# Patient Record
Sex: MALE | Race: WHITE | NOT HISPANIC OR LATINO | Employment: OTHER | URBAN - METROPOLITAN AREA
[De-identification: names, ages, dates, MRNs, and addresses within clinical notes are randomized per-mention and may not be internally consistent; named-entity substitution may affect disease eponyms.]

---

## 2017-01-26 ENCOUNTER — TRANSCRIBE ORDERS (OUTPATIENT)
Dept: ADMINISTRATIVE | Facility: HOSPITAL | Age: 57
End: 2017-01-26

## 2017-01-26 DIAGNOSIS — E03.9 UNSPECIFIED HYPOTHYROIDISM: Primary | ICD-10-CM

## 2017-01-30 ENCOUNTER — HOSPITAL ENCOUNTER (OUTPATIENT)
Dept: RADIOLOGY | Facility: HOSPITAL | Age: 57
Discharge: HOME | End: 2017-01-30
Payer: COMMERCIAL

## 2017-01-30 DIAGNOSIS — E03.9 UNSPECIFIED HYPOTHYROIDISM: ICD-10-CM

## 2017-01-30 PROCEDURE — 76536 US EXAM OF HEAD AND NECK: CPT

## 2019-11-18 ENCOUNTER — TELEPHONE (OUTPATIENT)
Dept: GASTROENTEROLOGY | Facility: CLINIC | Age: 59
End: 2019-11-18

## 2020-02-12 ENCOUNTER — OFFICE VISIT (OUTPATIENT)
Dept: GASTROENTEROLOGY | Facility: CLINIC | Age: 60
End: 2020-02-12
Payer: COMMERCIAL

## 2020-02-12 VITALS
HEART RATE: 70 BPM | HEIGHT: 67 IN | WEIGHT: 166.38 LBS | DIASTOLIC BLOOD PRESSURE: 70 MMHG | TEMPERATURE: 98.7 F | SYSTOLIC BLOOD PRESSURE: 112 MMHG | BODY MASS INDEX: 26.11 KG/M2

## 2020-02-12 DIAGNOSIS — Z86.010 HISTORY OF COLON POLYPS: Primary | ICD-10-CM

## 2020-02-12 PROBLEM — Z86.0100 HISTORY OF COLON POLYPS: Status: ACTIVE | Noted: 2020-02-12

## 2020-02-12 PROCEDURE — 99243 OFF/OP CNSLTJ NEW/EST LOW 30: CPT | Performed by: INTERNAL MEDICINE

## 2020-02-12 RX ORDER — LEVOTHYROXINE SODIUM 0.12 MG/1
125 TABLET ORAL DAILY
COMMUNITY
Start: 2020-01-06 | End: 2020-04-05

## 2020-02-12 RX ORDER — PRAVASTATIN SODIUM 40 MG
TABLET ORAL
COMMUNITY
Start: 2020-01-23

## 2020-02-12 NOTE — PROGRESS NOTES
Consultation - 126 [facility]  [address] [facility] 1960 male         Chief Complaint:  For colonoscopy    HPI:  75-year-old male with history of hyperlipidemia, hypothyroidism was referred for colonoscopy  He gives history of colon polyps on the last colonoscopy was about 10 years ago  Patient has regular bowel movements and denies any blood or mucus in the stool  Appetite is good and denies any recent weight loss  Denies any abdominal pain, nausea, or vomiting  Has no heartburn or acid reflux  Denies any difficulty swallowing  REVIEW OF SYSTEMS: Review of Systems   Constitutional: Negative for activity change, appetite change, chills, diaphoresis, fatigue, fever and unexpected weight change  HENT: Negative for ear discharge, ear pain, facial swelling, hearing loss, nosebleeds, sore throat, tinnitus and voice change  Eyes: Negative for pain, discharge, redness, itching and visual disturbance  Respiratory: Negative for apnea, cough, chest tightness, shortness of breath and wheezing  Cardiovascular: Negative for chest pain and palpitations  Gastrointestinal:        As noted in HPI   Endocrine: Negative for cold intolerance, heat intolerance and polyuria  Genitourinary: Negative for difficulty urinating, dysuria, flank pain, hematuria and urgency  Musculoskeletal: Negative for arthralgias, back pain, gait problem, joint swelling and myalgias  Skin: Negative for rash and wound  Neurological: Negative for dizziness, tremors, seizures, speech difficulty, light-headedness, numbness and headaches  Hematological: Negative for adenopathy  Does not bruise/bleed easily  Psychiatric/Behavioral: Negative for agitation, behavioral problems and confusion  The patient is not nervous/anxious           Past Medical History:   Diagnosis Date    Hyperlipidemia     Hypothyroidism       Past Surgical History:   Procedure Laterality Date    CYST REMOVAL      lower back     HEMORROIDECTOMY      two surgerys in late 30-40's     Social History     Socioeconomic History    Marital status: /Civil Union     Spouse name: Not on file    Number of children: Not on file    Years of education: Not on file    Highest education level: Not on file   Occupational History    Not on file   Social Needs    Financial resource strain: Not on file    Food insecurity:     Worry: Not on file     Inability: Not on file    Transportation needs:     Medical: Not on file     Non-medical: Not on file   Tobacco Use    Smoking status: Former Smoker    Smokeless tobacco: Never Used   Substance and Sexual Activity    Alcohol use: Yes     Comment: occ    Drug use: Never    Sexual activity: Not on file   Lifestyle    Physical activity:     Days per week: Not on file     Minutes per session: Not on file    Stress: Not on file   Relationships    Social connections:     Talks on phone: Not on file     Gets together: Not on file     Attends Scientologist service: Not on file     Active member of club or organization: Not on file     Attends meetings of clubs or organizations: Not on file     Relationship status: Not on file    Intimate partner violence:     Fear of current or ex partner: Not on file     Emotionally abused: Not on file     Physically abused: Not on file     Forced sexual activity: Not on file   Other Topics Concern    Not on file   Social History Narrative    Not on file     Family History   Problem Relation Age of Onset    Cancer Mother     Ovarian cancer Mother     Heart attack Father     Thyroid cancer Maternal Grandmother      Patient has no known allergies    Current Outpatient Medications   Medication Sig Dispense Refill    levothyroxine 125 mcg tablet Take 125 mcg by mouth daily      pravastatin (PRAVACHOL) 40 mg tablet TAKE 1 TABLET BY MOUTH  NIGHTLY      Na Sulfate-K Sulfate-Mg Sulf (SUPREP BOWEL PREP KIT) 17 5-3 13-1 6 GM/177ML SOLN Take 2 Bottles by mouth see administration instructions Please follow the instructions from the office 2 Bottle 0     No current facility-administered medications for this visit  Blood pressure 112/70, pulse 70, temperature 98 7 °F (37 1 °C), height 5' 7" (1 702 m), weight 75 5 kg (166 lb 6 oz)  PHYSICAL EXAM: Physical Exam   Constitutional: He is oriented to person, place, and time  He appears well-developed  HENT:   Head: Normocephalic and atraumatic  Mouth/Throat: Oropharynx is clear and moist    Eyes: Pupils are equal, round, and reactive to light  Conjunctivae are normal  Right eye exhibits no discharge  Left eye exhibits no discharge  No scleral icterus  Neck: Neck supple  No JVD present  No tracheal deviation present  No thyromegaly present  Cardiovascular: Normal rate, regular rhythm, normal heart sounds and intact distal pulses  Exam reveals no gallop and no friction rub  No murmur heard  Pulmonary/Chest: Effort normal and breath sounds normal  No respiratory distress  He has no wheezes  He has no rales  He exhibits no tenderness  Abdominal: Soft  Bowel sounds are normal  He exhibits no distension and no mass  There is no tenderness  There is no rebound and no guarding  No hernia  Musculoskeletal: He exhibits no edema  Lymphadenopathy:     He has no cervical adenopathy  Neurological: He is alert and oriented to person, place, and time  Skin: Skin is warm and dry  No rash noted  No erythema  Psychiatric: He has a normal mood and affect  His behavior is normal  Thought content normal         No results found for: WBC, HGB, HCT, MCV, PLT  No results found for: GLUCOSE, CALCIUM, NA, K, CO2, CL, BUN, CREATININE  No results found for: ALT, AST, GGT, ALKPHOS, BILITOT  No results found for: INR, PROTIME    Us Thyroid    Result Date: 1/31/2017  Impression:  Small heterogeneous thyroid gland without interval change  No nodules      Workstation performed: [id number]       ASSESSMENT & PLAN:    History of colon polyps  Personal history of colon polyps- patient is at increased risk for colon cancer screening  Rule out colorectal lesions including polyps or malignancy     -Schedule for colonoscopy  -High-fiber diet     -Patient was given instructions about the colonoscopy prep     -Patient was explained about  the risks and benefits of the procedure  Risks including but not limited to bleeding, infection, perforation were explained in detail  Also explained about less than 100% sensitivity with the exam and other alternatives

## 2020-02-12 NOTE — LETTER
February 13, 2020     Referral [facility]    Patient: Abi Blank   YOB: 1960   Date of Visit: 2/12/2020       Dear Dr Pérez Johns:    Thank you for referring Alta Drake to me for evaluation  Below are my notes for this consultation  If you have questions, please do not hesitate to call me  I look forward to following your patient along with you  Sincerely,        Shannan Sofia MD        CC: DO Shannan Lange MD  2/12/2020  3:56 PM  Addendum  Consultation - 126 [facility]  Abi Blank 1960 male         Chief Complaint:  For colonoscopy    HPI:  57-year-old male with history of hyperlipidemia, hypothyroidism was referred for colonoscopy  He gives history of colon polyps on the last colonoscopy was about 10 years ago  Patient has regular bowel movements and denies any blood or mucus in the stool  Appetite is good and denies any recent weight loss  Denies any abdominal pain, nausea, or vomiting  Has no heartburn or acid reflux  Denies any difficulty swallowing  REVIEW OF SYSTEMS: Review of Systems   Constitutional: Negative for activity change, appetite change, chills, diaphoresis, fatigue, fever and unexpected weight change  HENT: Negative for ear discharge, ear pain, facial swelling, hearing loss, nosebleeds, sore throat, tinnitus and voice change  Eyes: Negative for pain, discharge, redness, itching and visual disturbance  Respiratory: Negative for apnea, cough, chest tightness, shortness of breath and wheezing  Cardiovascular: Negative for chest pain and palpitations  Gastrointestinal:        As noted in HPI   Endocrine: Negative for cold intolerance, heat intolerance and polyuria  Genitourinary: Negative for difficulty urinating, dysuria, flank pain, hematuria and urgency  Musculoskeletal: Negative for arthralgias, back pain, gait problem, joint swelling and myalgias     Skin: Negative for rash and wound  Neurological: Negative for dizziness, tremors, seizures, speech difficulty, light-headedness, numbness and headaches  Hematological: Negative for adenopathy  Does not bruise/bleed easily  Psychiatric/Behavioral: Negative for agitation, behavioral problems and confusion  The patient is not nervous/anxious           Past Medical History:   Diagnosis Date    Hyperlipidemia     Hypothyroidism       Past Surgical History:   Procedure Laterality Date    CYST REMOVAL      lower back     HEMORROIDECTOMY      two surgerys in late 30-40's     Social History     Socioeconomic History    Marital status: /Civil Union     Spouse name: Not on file    Number of children: Not on file    Years of education: Not on file    Highest education level: Not on file   Occupational History    Not on file   Social Needs    Financial resource strain: Not on file    Food insecurity:     Worry: Not on file     Inability: Not on file    Transportation needs:     Medical: Not on file     Non-medical: Not on file   Tobacco Use    Smoking status: Former Smoker    Smokeless tobacco: Never Used   Substance and Sexual Activity    Alcohol use: Yes     Comment: occ    Drug use: Never    Sexual activity: Not on file   Lifestyle    Physical activity:     Days per week: Not on file     Minutes per session: Not on file    Stress: Not on file   Relationships    Social connections:     Talks on phone: Not on file     Gets together: Not on file     Attends Alevism service: Not on file     Active member of club or organization: Not on file     Attends meetings of clubs or organizations: Not on file     Relationship status: Not on file    Intimate partner violence:     Fear of current or ex partner: Not on file     Emotionally abused: Not on file     Physically abused: Not on file     Forced sexual activity: Not on file   Other Topics Concern    Not on file   Social History Narrative    Not on file     Family History Problem Relation Age of Onset    Cancer Mother     Ovarian cancer Mother     Heart attack Father     Thyroid cancer Maternal Grandmother      Patient has no known allergies  Current Outpatient Medications   Medication Sig Dispense Refill    levothyroxine 125 mcg tablet Take 125 mcg by mouth daily      pravastatin (PRAVACHOL) 40 mg tablet TAKE 1 TABLET BY MOUTH  NIGHTLY      Na Sulfate-K Sulfate-Mg Sulf (SUPREP BOWEL PREP KIT) 17 5-3 13-1 6 GM/177ML SOLN Take 2 Bottles by mouth see administration instructions Please follow the instructions from the office 2 Bottle 0     No current facility-administered medications for this visit  Blood pressure 112/70, pulse 70, temperature 98 7 °F (37 1 °C), height 5' 7" (1 702 m), weight 75 5 kg (166 lb 6 oz)  PHYSICAL EXAM: Physical Exam   Constitutional: He is oriented to person, place, and time  He appears well-developed  HENT:   Head: Normocephalic and atraumatic  Mouth/Throat: Oropharynx is clear and moist    Eyes: Pupils are equal, round, and reactive to light  Conjunctivae are normal  Right eye exhibits no discharge  Left eye exhibits no discharge  No scleral icterus  Neck: Neck supple  No JVD present  No tracheal deviation present  No thyromegaly present  Cardiovascular: Normal rate, regular rhythm, normal heart sounds and intact distal pulses  Exam reveals no gallop and no friction rub  No murmur heard  Pulmonary/Chest: Effort normal and breath sounds normal  No respiratory distress  He has no wheezes  He has no rales  He exhibits no tenderness  Abdominal: Soft  Bowel sounds are normal  He exhibits no distension and no mass  There is no tenderness  There is no rebound and no guarding  No hernia  Musculoskeletal: He exhibits no edema  Lymphadenopathy:     He has no cervical adenopathy  Neurological: He is alert and oriented to person, place, and time  Skin: Skin is warm and dry  No rash noted  No erythema     Psychiatric: He has a normal mood and affect  His behavior is normal  Thought content normal         No results found for: WBC, HGB, HCT, MCV, PLT  No results found for: GLUCOSE, CALCIUM, NA, K, CO2, CL, BUN, CREATININE  No results found for: ALT, AST, GGT, ALKPHOS, BILITOT  No results found for: INR, PROTIME    Us Thyroid    Result Date: 1/31/2017  Impression:  Small heterogeneous thyroid gland without interval change  No nodules  Workstation performed: [id number]       ASSESSMENT & PLAN:    History of colon polyps  Personal history of colon polyps- patient is at increased risk for colon cancer screening  Rule out colorectal lesions including polyps or malignancy     -Schedule for colonoscopy  -High-fiber diet     -Patient was given instructions about the colonoscopy prep     -Patient was explained about  the risks and benefits of the procedure  Risks including but not limited to bleeding, infection, perforation were explained in detail  Also explained about less than 100% sensitivity with the exam and other alternatives

## 2020-02-12 NOTE — H&P (VIEW-ONLY)
Consultation - 126 [facility]  [address] [facility] 1960 male         Chief Complaint:  For colonoscopy    HPI:  39-year-old male with history of hyperlipidemia, hypothyroidism was referred for colonoscopy  He gives history of colon polyps on the last colonoscopy was about 10 years ago  Patient has regular bowel movements and denies any blood or mucus in the stool  Appetite is good and denies any recent weight loss  Denies any abdominal pain, nausea, or vomiting  Has no heartburn or acid reflux  Denies any difficulty swallowing  REVIEW OF SYSTEMS: Review of Systems   Constitutional: Negative for activity change, appetite change, chills, diaphoresis, fatigue, fever and unexpected weight change  HENT: Negative for ear discharge, ear pain, facial swelling, hearing loss, nosebleeds, sore throat, tinnitus and voice change  Eyes: Negative for pain, discharge, redness, itching and visual disturbance  Respiratory: Negative for apnea, cough, chest tightness, shortness of breath and wheezing  Cardiovascular: Negative for chest pain and palpitations  Gastrointestinal:        As noted in HPI   Endocrine: Negative for cold intolerance, heat intolerance and polyuria  Genitourinary: Negative for difficulty urinating, dysuria, flank pain, hematuria and urgency  Musculoskeletal: Negative for arthralgias, back pain, gait problem, joint swelling and myalgias  Skin: Negative for rash and wound  Neurological: Negative for dizziness, tremors, seizures, speech difficulty, light-headedness, numbness and headaches  Hematological: Negative for adenopathy  Does not bruise/bleed easily  Psychiatric/Behavioral: Negative for agitation, behavioral problems and confusion  The patient is not nervous/anxious           Past Medical History:   Diagnosis Date    Hyperlipidemia     Hypothyroidism       Past Surgical History:   Procedure Laterality Date    CYST REMOVAL      lower back     HEMORROIDECTOMY      two surgerys in late 30-40's     Social History     Socioeconomic History    Marital status: /Civil Union     Spouse name: Not on file    Number of children: Not on file    Years of education: Not on file    Highest education level: Not on file   Occupational History    Not on file   Social Needs    Financial resource strain: Not on file    Food insecurity:     Worry: Not on file     Inability: Not on file    Transportation needs:     Medical: Not on file     Non-medical: Not on file   Tobacco Use    Smoking status: Former Smoker    Smokeless tobacco: Never Used   Substance and Sexual Activity    Alcohol use: Yes     Comment: occ    Drug use: Never    Sexual activity: Not on file   Lifestyle    Physical activity:     Days per week: Not on file     Minutes per session: Not on file    Stress: Not on file   Relationships    Social connections:     Talks on phone: Not on file     Gets together: Not on file     Attends Roman Catholic service: Not on file     Active member of club or organization: Not on file     Attends meetings of clubs or organizations: Not on file     Relationship status: Not on file    Intimate partner violence:     Fear of current or ex partner: Not on file     Emotionally abused: Not on file     Physically abused: Not on file     Forced sexual activity: Not on file   Other Topics Concern    Not on file   Social History Narrative    Not on file     Family History   Problem Relation Age of Onset    Cancer Mother     Ovarian cancer Mother     Heart attack Father     Thyroid cancer Maternal Grandmother      Patient has no known allergies    Current Outpatient Medications   Medication Sig Dispense Refill    levothyroxine 125 mcg tablet Take 125 mcg by mouth daily      pravastatin (PRAVACHOL) 40 mg tablet TAKE 1 TABLET BY MOUTH  NIGHTLY      Na Sulfate-K Sulfate-Mg Sulf (SUPREP BOWEL PREP KIT) 17 5-3 13-1 6 GM/177ML SOLN Take 2 Bottles by mouth see administration instructions Please follow the instructions from the office 2 Bottle 0     No current facility-administered medications for this visit  Blood pressure 112/70, pulse 70, temperature 98 7 °F (37 1 °C), height 5' 7" (1 702 m), weight 75 5 kg (166 lb 6 oz)  PHYSICAL EXAM: Physical Exam   Constitutional: He is oriented to person, place, and time  He appears well-developed  HENT:   Head: Normocephalic and atraumatic  Mouth/Throat: Oropharynx is clear and moist    Eyes: Pupils are equal, round, and reactive to light  Conjunctivae are normal  Right eye exhibits no discharge  Left eye exhibits no discharge  No scleral icterus  Neck: Neck supple  No JVD present  No tracheal deviation present  No thyromegaly present  Cardiovascular: Normal rate, regular rhythm, normal heart sounds and intact distal pulses  Exam reveals no gallop and no friction rub  No murmur heard  Pulmonary/Chest: Effort normal and breath sounds normal  No respiratory distress  He has no wheezes  He has no rales  He exhibits no tenderness  Abdominal: Soft  Bowel sounds are normal  He exhibits no distension and no mass  There is no tenderness  There is no rebound and no guarding  No hernia  Musculoskeletal: He exhibits no edema  Lymphadenopathy:     He has no cervical adenopathy  Neurological: He is alert and oriented to person, place, and time  Skin: Skin is warm and dry  No rash noted  No erythema  Psychiatric: He has a normal mood and affect  His behavior is normal  Thought content normal         No results found for: WBC, HGB, HCT, MCV, PLT  No results found for: GLUCOSE, CALCIUM, NA, K, CO2, CL, BUN, CREATININE  No results found for: ALT, AST, GGT, ALKPHOS, BILITOT  No results found for: INR, PROTIME    Us Thyroid    Result Date: 1/31/2017  Impression:  Small heterogeneous thyroid gland without interval change  No nodules      Workstation performed: [id number]       ASSESSMENT & PLAN:    History of colon polyps  Personal history of colon polyps- patient is at increased risk for colon cancer screening  Rule out colorectal lesions including polyps or malignancy     -Schedule for colonoscopy  -High-fiber diet     -Patient was given instructions about the colonoscopy prep     -Patient was explained about  the risks and benefits of the procedure  Risks including but not limited to bleeding, infection, perforation were explained in detail  Also explained about less than 100% sensitivity with the exam and other alternatives

## 2020-02-20 NOTE — PRE-PROCEDURE INSTRUCTIONS
Pre-Surgery Instructions:   Medication Instructions    levothyroxine 125 mcg tablet Patient was instructed by Physician and understands   pravastatin (PRAVACHOL) 40 mg tablet Patient was instructed by Physician and understands

## 2020-02-23 ENCOUNTER — ANESTHESIA EVENT (OUTPATIENT)
Dept: GASTROENTEROLOGY | Facility: AMBULARY SURGERY CENTER | Age: 60
End: 2020-02-23

## 2020-02-24 ENCOUNTER — HOSPITAL ENCOUNTER (OUTPATIENT)
Dept: GASTROENTEROLOGY | Facility: AMBULARY SURGERY CENTER | Age: 60
Setting detail: OUTPATIENT SURGERY
Discharge: HOME | End: 2020-02-24
Attending: INTERNAL MEDICINE | Admitting: INTERNAL MEDICINE
Payer: COMMERCIAL

## 2020-02-24 ENCOUNTER — ANESTHESIA (OUTPATIENT)
Dept: GASTROENTEROLOGY | Facility: AMBULARY SURGERY CENTER | Age: 60
End: 2020-02-24

## 2020-02-24 VITALS
RESPIRATION RATE: 16 BRPM | DIASTOLIC BLOOD PRESSURE: 73 MMHG | OXYGEN SATURATION: 99 % | SYSTOLIC BLOOD PRESSURE: 143 MMHG | TEMPERATURE: 97.3 F | HEART RATE: 60 BPM

## 2020-02-24 DIAGNOSIS — Z86.010 HISTORY OF COLON POLYPS: ICD-10-CM

## 2020-02-24 PROCEDURE — 45385 COLONOSCOPY W/LESION REMOVAL: CPT | Performed by: INTERNAL MEDICINE

## 2020-02-24 PROCEDURE — 88305 TISSUE EXAM BY PATHOLOGIST: CPT | Performed by: PATHOLOGY

## 2020-02-24 RX ORDER — SODIUM CHLORIDE, SODIUM LACTATE, POTASSIUM CHLORIDE, CALCIUM CHLORIDE 600; 310; 30; 20 MG/100ML; MG/100ML; MG/100ML; MG/100ML
125 INJECTION, SOLUTION INTRAVENOUS CONTINUOUS
Status: DISCONTINUED | OUTPATIENT
Start: 2020-02-24 | End: 2020-02-28 | Stop reason: HOSPADM

## 2020-02-24 RX ORDER — PROPOFOL 10 MG/ML
INJECTION, EMULSION INTRAVENOUS CONTINUOUS PRN
Status: DISCONTINUED | OUTPATIENT
Start: 2020-02-24 | End: 2020-02-24 | Stop reason: SURG

## 2020-02-24 RX ORDER — SODIUM CHLORIDE, SODIUM LACTATE, POTASSIUM CHLORIDE, CALCIUM CHLORIDE 600; 310; 30; 20 MG/100ML; MG/100ML; MG/100ML; MG/100ML
INJECTION, SOLUTION INTRAVENOUS CONTINUOUS PRN
Status: DISCONTINUED | OUTPATIENT
Start: 2020-02-24 | End: 2020-02-24 | Stop reason: SURG

## 2020-02-24 RX ORDER — PROPOFOL 10 MG/ML
INJECTION, EMULSION INTRAVENOUS AS NEEDED
Status: DISCONTINUED | OUTPATIENT
Start: 2020-02-24 | End: 2020-02-24 | Stop reason: SURG

## 2020-02-24 RX ORDER — ONDANSETRON 2 MG/ML
4 INJECTION INTRAMUSCULAR; INTRAVENOUS ONCE AS NEEDED
Status: DISCONTINUED | OUTPATIENT
Start: 2020-02-24 | End: 2020-02-28 | Stop reason: HOSPADM

## 2020-02-24 RX ADMIN — PROPOFOL 100 MG: 10 INJECTION, EMULSION INTRAVENOUS at 09:40

## 2020-02-24 RX ADMIN — PROPOFOL 150 MCG/KG/MIN: 10 INJECTION, EMULSION INTRAVENOUS at 09:40

## 2020-02-24 RX ADMIN — SODIUM CHLORIDE, SODIUM LACTATE, POTASSIUM CHLORIDE, AND CALCIUM CHLORIDE: .6; .31; .03; .02 INJECTION, SOLUTION INTRAVENOUS at 09:34

## 2020-02-24 NOTE — INTERVAL H&P NOTE
H&P reviewed  After examining the patient I find no changes in the patients condition since the H&P had been written      Vitals:    02/24/20 0833   BP: 148/82   Pulse: (!) 48   Resp: 16   Temp: (!) 97 3 °F (36 3 °C)   SpO2: 99%

## 2020-02-24 NOTE — ANESTHESIA POSTPROCEDURE EVALUATION
Post-Op Assessment Note    CV Status:  Stable  Pain Score: 0    Pain management: adequate     Mental Status:  Alert and awake   Hydration Status:  Euvolemic   PONV Controlled:  Controlled   Airway Patency:  Patent   Post Op Vitals Reviewed: Yes      Staff: Anesthesiologist, CRNA   Comments: Report given to recovering RN, VSS, Pt resting comfortably          /70 (02/24/20 0956)    Temp     Pulse 58 (02/24/20 0956)   Resp 16 (02/24/20 0956)    SpO2 99 % (02/24/20 0956)

## 2020-02-24 NOTE — ANESTHESIA PREPROCEDURE EVALUATION
Review of Systems/Medical History  Patient summary reviewed    No history of anesthetic complications     Cardiovascular  Exercise tolerance (METS): >4,  Hyperlipidemia, No dysrhythmias , No angina ,    Pulmonary  Negative pulmonary ROS No shortness of breath,        GI/Hepatic    Bowel prep            Endo/Other  History of thyroid disease , hypothyroidism,      GYN       Hematology   Musculoskeletal       Neurology   Psychology           Physical Exam    Airway    Mallampati score: I  TM Distance: >3 FB  Neck ROM: full     Dental   No notable dental hx     Cardiovascular  Rhythm: regular, Rate: abnormal,     Pulmonary  Breath sounds clear to auscultation,     Other Findings        Anesthesia Plan  ASA Score- 2     Anesthesia Type- IV sedation with anesthesia with ASA Monitors  Additional Monitors:   Airway Plan:         Plan Factors-  Patient did not smoke on day of surgery  Induction- intravenous  Postoperative Plan-     Informed Consent- Anesthetic plan and risks discussed with patient  I personally reviewed this patient with the CRNA  Discussed and agreed on the Anesthesia Plan with the CRNA  Mark Saravia

## 2020-02-28 ENCOUNTER — TELEPHONE (OUTPATIENT)
Dept: GASTROENTEROLOGY | Facility: AMBULARY SURGERY CENTER | Age: 60
End: 2020-02-28

## 2020-02-28 NOTE — TELEPHONE ENCOUNTER
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

## 2024-01-22 ENCOUNTER — APPOINTMENT (OUTPATIENT)
Dept: RADIOLOGY | Facility: CLINIC | Age: 64
End: 2024-01-22
Payer: COMMERCIAL

## 2024-01-22 DIAGNOSIS — G89.29 OTHER CHRONIC PAIN: ICD-10-CM

## 2024-01-22 DIAGNOSIS — M54.50 LOW BACK PAIN, UNSPECIFIED: ICD-10-CM

## 2024-01-22 PROCEDURE — 72110 X-RAY EXAM L-2 SPINE 4/>VWS: CPT

## 2024-01-29 ENCOUNTER — HOSPITAL ENCOUNTER (OUTPATIENT)
Dept: RADIOLOGY | Facility: HOSPITAL | Age: 64
Discharge: HOME | End: 2024-01-29
Payer: COMMERCIAL

## 2024-01-29 DIAGNOSIS — Z00.00 ROUTINE GENERAL MEDICAL EXAMINATION AT A HEALTH CARE FACILITY: ICD-10-CM

## 2024-01-29 PROCEDURE — 76775 US EXAM ABDO BACK WALL LIM: CPT

## 2024-01-30 ENCOUNTER — HOSPITAL ENCOUNTER (OUTPATIENT)
Dept: NON INVASIVE DIAGNOSTICS | Facility: HOSPITAL | Age: 64
Discharge: HOME | End: 2024-01-30
Payer: COMMERCIAL

## 2024-01-30 VITALS
HEART RATE: 64 BPM | WEIGHT: 166 LBS | DIASTOLIC BLOOD PRESSURE: 77 MMHG | HEIGHT: 67 IN | BODY MASS INDEX: 26.06 KG/M2 | SYSTOLIC BLOOD PRESSURE: 143 MMHG

## 2024-01-30 DIAGNOSIS — R00.2 PALPITATIONS: ICD-10-CM

## 2024-01-30 LAB
AORTIC ROOT: 2.9 CM
AORTIC VALVE MEAN VELOCITY: 8.5 M/S
APICAL FOUR CHAMBER EJECTION FRACTION: 60 %
AV LVOT MEAN GRADIENT: 2 MMHG
AV LVOT PEAK GRADIENT: 4 MMHG
AV MEAN GRADIENT: 3 MMHG
AV PEAK GRADIENT: 7 MMHG
AV VELOCITY RATIO: 0.73
BSA FOR ECHO PROCEDURE: 1.87 M2
CHEST PAIN STATEMENT: NORMAL
DOP CALC AO PEAK VEL: 1.35 M/S
DOP CALC AO VTI: 29.55 CM
DOP CALC LVOT PEAK VEL VTI: 20.97 CM
DOP CALC LVOT PEAK VEL: 0.98 M/S
E WAVE DECELERATION TIME: 364 MS
E/A RATIO: 1.47
FRACTIONAL SHORTENING: 30 (ref 28–44)
INTERVENTRICULAR SEPTUM IN DIASTOLE (PARASTERNAL SHORT AXIS VIEW): 1 CM
INTERVENTRICULAR SEPTUM: 1 CM (ref 0.6–1.1)
LAAS-AP2: 21.3 CM2
LAAS-AP4: 18.8 CM2
LEFT ATRIUM AREA SYSTOLE SINGLE PLANE A4C: 19.6 CM2
LEFT ATRIUM SIZE: 3.6 CM
LEFT ATRIUM VOLUME (MOD BIPLANE): 59 ML
LEFT ATRIUM VOLUME INDEX (MOD BIPLANE): 31.6 ML/M2
LEFT INTERNAL DIMENSION IN SYSTOLE: 3.3 CM (ref 2.1–4)
LEFT VENTRICULAR INTERNAL DIMENSION IN DIASTOLE: 4.7 CM (ref 3.5–6)
LEFT VENTRICULAR POSTERIOR WALL IN END DIASTOLE: 0.9 CM
LEFT VENTRICULAR STROKE VOLUME: 62 ML
LVSV (TEICH): 62 ML
MAX DIASTOLIC BP: 84 MMHG
MAX HR PERCENT: 101 %
MAX HR: 157 BPM
MAX PREDICTED HEART RATE: 155 BPM
MV E'TISSUE VEL-SEP: 10 CM/S
MV PEAK A VEL: 0.47 M/S
MV PEAK E VEL: 69 CM/S
MV STENOSIS PRESSURE HALF TIME: 106 MS
MV VALVE AREA P 1/2 METHOD: 2.08
PROTOCOL NAME: NORMAL
RATE PRESSURE PRODUCT: NORMAL
REASON FOR TERMINATION: NORMAL
RIGHT ATRIUM AREA SYSTOLE A4C: 18.5 CM2
RIGHT VENTRICLE ID DIMENSION: 4.2 CM
SL CV LEFT ATRIUM LENGTH A2C: 5.6 CM
SL CV PED ECHO LEFT VENTRICLE DIASTOLIC VOLUME (MOD BIPLANE) 2D: 104 ML
SL CV PED ECHO LEFT VENTRICLE SYSTOLIC VOLUME (MOD BIPLANE) 2D: 43 ML
SL CV STRESS RECOVERY BP: NORMAL MMHG
SL CV STRESS RECOVERY HR: 83 BPM
SL CV STRESS RECOVERY O2 SAT: 98 %
SL CV STRESS STAGE REACHED: 6
STRESS ANGINA INDEX: 0
STRESS BASELINE BP: NORMAL MMHG
STRESS BASELINE HR: 61 BPM
STRESS O2 SAT REST: 98 %
STRESS PEAK HR: 155 BPM
STRESS POST ESTIMATED WORKLOAD: 20.3 METS
STRESS POST EXERCISE DUR MIN: 16 MIN
STRESS POST EXERCISE DUR MIN: 16 MIN
STRESS POST EXERCISE DUR SEC: 1 SEC
STRESS POST O2 SAT PEAK: 98 %
STRESS POST PEAK BP: 176 MMHG
STRESS POST PEAK HR: 157 BPM
STRESS POST PEAK SYSTOLIC BP: 176 MMHG
TARGET HR FORMULA: NORMAL
TEST INDICATION: NORMAL
TR MAX PG: 27 MMHG
TR PEAK VELOCITY: 2.6 M/S
TRICUSPID ANNULAR PLANE SYSTOLIC EXCURSION: 2.6 CM
TRICUSPID VALVE PEAK REGURGITATION VELOCITY: 2.6 M/S

## 2024-01-30 PROCEDURE — 93017 CV STRESS TEST TRACING ONLY: CPT

## 2024-01-30 PROCEDURE — 93018 CV STRESS TEST I&R ONLY: CPT | Performed by: INTERNAL MEDICINE

## 2024-01-30 PROCEDURE — 93306 TTE W/DOPPLER COMPLETE: CPT

## 2024-01-30 PROCEDURE — 93016 CV STRESS TEST SUPVJ ONLY: CPT | Performed by: INTERNAL MEDICINE

## 2024-01-30 PROCEDURE — 93306 TTE W/DOPPLER COMPLETE: CPT | Performed by: INTERNAL MEDICINE

## 2025-03-11 ENCOUNTER — TELEPHONE (OUTPATIENT)
Dept: GASTROENTEROLOGY | Facility: AMBULARY SURGERY CENTER | Age: 65
End: 2025-03-11

## 2025-03-12 ENCOUNTER — TELEPHONE (OUTPATIENT)
Age: 65
End: 2025-03-12

## 2025-03-12 NOTE — TELEPHONE ENCOUNTER
Scheduled date of colonoscopy (as of today): 06/10/25  Physician performing colonoscopy: Dr. Paredes  Location of colonoscopy: [facility]  Bowel prep reviewed with patient: Telly/Ahmet via email [email or web address]'  Instructions reviewed with patient by: Tatyana  Clearances: n/a

## 2025-03-12 NOTE — TELEPHONE ENCOUNTER
03/12/25  Screened by: Мария Flores    Referring Provider Dr. Paredes    Pre- Screening:     There is no height or weight on file to calculate BMI.  Has patient been referred for a routine screening Colonoscopy? yes  Is the patient between 45-75 years old? yes      Previous Colonoscopy yes   If yes:    Date: 02/24/2020    Facility: [facility]     Reason: Colon Polyps        Does the patient want to see a Gastroenterologist prior to their procedure OR are they having any GI symptoms? no    Has the patient been hospitalized or had abdominal surgery in the past 6 months? no    Does the patient use supplemental oxygen? no    Does the patient take Coumadin, Lovenox, Plavix, Elliquis, Xarelto, or other blood thinning medication? no    Has the patient had a stroke, cardiac event, or stent placed in the past year? no       If patient is between 45yrs - 49yrs, please advise patient that we will have to confirm benefits & coverage with their insurance company for a routine screening colonoscopy.

## 2025-05-27 ENCOUNTER — ANESTHESIA (OUTPATIENT)
Dept: ANESTHESIOLOGY | Facility: HOSPITAL | Age: 65
End: 2025-05-27

## 2025-05-27 ENCOUNTER — ANESTHESIA EVENT (OUTPATIENT)
Dept: ANESTHESIOLOGY | Facility: HOSPITAL | Age: 65
End: 2025-05-27

## 2025-06-09 RX ORDER — SODIUM CHLORIDE, SODIUM LACTATE, POTASSIUM CHLORIDE, CALCIUM CHLORIDE 600; 310; 30; 20 MG/100ML; MG/100ML; MG/100ML; MG/100ML
75 INJECTION, SOLUTION INTRAVENOUS CONTINUOUS
Status: CANCELLED | OUTPATIENT
Start: 2025-06-09

## 2025-06-10 ENCOUNTER — ANESTHESIA EVENT (OUTPATIENT)
Dept: GASTROENTEROLOGY | Facility: AMBULARY SURGERY CENTER | Age: 65
End: 2025-06-10
Payer: COMMERCIAL

## 2025-06-10 ENCOUNTER — HOSPITAL ENCOUNTER (OUTPATIENT)
Dept: GASTROENTEROLOGY | Facility: AMBULARY SURGERY CENTER | Age: 65
Setting detail: OUTPATIENT SURGERY
Discharge: HOME | End: 2025-06-10
Attending: INTERNAL MEDICINE
Payer: COMMERCIAL

## 2025-06-10 VITALS
RESPIRATION RATE: 16 BRPM | OXYGEN SATURATION: 98 % | TEMPERATURE: 97.9 F | DIASTOLIC BLOOD PRESSURE: 76 MMHG | SYSTOLIC BLOOD PRESSURE: 128 MMHG | HEART RATE: 54 BPM

## 2025-06-10 DIAGNOSIS — Z86.0101 HX OF ADENOMATOUS COLONIC POLYPS: ICD-10-CM

## 2025-06-10 PROBLEM — E78.5 HYPERLIPIDEMIA: Status: ACTIVE | Noted: 2025-06-10

## 2025-06-10 PROBLEM — E03.9 HYPOTHYROIDISM: Status: ACTIVE | Noted: 2025-06-10

## 2025-06-10 PROCEDURE — G0105 COLORECTAL SCRN; HI RISK IND: HCPCS | Performed by: INTERNAL MEDICINE

## 2025-06-10 RX ORDER — PROPOFOL 10 MG/ML
INJECTION, EMULSION INTRAVENOUS AS NEEDED
Status: DISCONTINUED | OUTPATIENT
Start: 2025-06-10 | End: 2025-06-10

## 2025-06-10 RX ORDER — LIDOCAINE HYDROCHLORIDE 10 MG/ML
INJECTION, SOLUTION EPIDURAL; INFILTRATION; INTRACAUDAL; PERINEURAL AS NEEDED
Status: DISCONTINUED | OUTPATIENT
Start: 2025-06-10 | End: 2025-06-10

## 2025-06-10 RX ORDER — PROPOFOL 10 MG/ML
INJECTION, EMULSION INTRAVENOUS CONTINUOUS PRN
Status: DISCONTINUED | OUTPATIENT
Start: 2025-06-10 | End: 2025-06-10

## 2025-06-10 RX ORDER — SODIUM CHLORIDE, SODIUM LACTATE, POTASSIUM CHLORIDE, CALCIUM CHLORIDE 600; 310; 30; 20 MG/100ML; MG/100ML; MG/100ML; MG/100ML
75 INJECTION, SOLUTION INTRAVENOUS CONTINUOUS
Status: DISCONTINUED | OUTPATIENT
Start: 2025-06-10 | End: 2025-06-14 | Stop reason: HOSPADM

## 2025-06-10 RX ADMIN — PROPOFOL 140 MCG/KG/MIN: 10 INJECTION, EMULSION INTRAVENOUS at 09:00

## 2025-06-10 RX ADMIN — PROPOFOL 50 MG: 10 INJECTION, EMULSION INTRAVENOUS at 08:58

## 2025-06-10 RX ADMIN — PROPOFOL 100 MG: 10 INJECTION, EMULSION INTRAVENOUS at 08:56

## 2025-06-10 RX ADMIN — LIDOCAINE HYDROCHLORIDE 50 MG: 10 INJECTION, SOLUTION EPIDURAL; INFILTRATION; INTRACAUDAL; PERINEURAL at 08:56

## 2025-06-10 RX ADMIN — SODIUM CHLORIDE, SODIUM LACTATE, POTASSIUM CHLORIDE, AND CALCIUM CHLORIDE: .6; .31; .03; .02 INJECTION, SOLUTION INTRAVENOUS at 08:54

## 2025-06-10 NOTE — ANESTHESIA POSTPROCEDURE EVALUATION
Post-Op Assessment Note    CV Status:  Stable  Pain Score: 0    Pain management: adequate       Mental Status:  Sleepy   Hydration Status:  Stable   PONV Controlled:  None   Airway Patency:  Patent     Post Op Vitals Reviewed: Yes    No anethesia notable event occurred.    Staff: Anesthesiologist, CRNA           Last Filed PACU Vitals:  Vitals Value Taken Time   Temp     Pulse 56    /70    Resp 14    SpO2 98

## 2025-06-10 NOTE — PERIOPERATIVE NURSING NOTE
Patient received into GI room via stretcher from GI procedural room. Report received from RN and CRNA at bedside. Patient is sleepy, no signs of distress noted. Stretcher locked and in the lowest position. Side rails up on the both sides, call bell within patient's reach.

## 2025-06-10 NOTE — ANESTHESIA PREPROCEDURE EVALUATION
Procedure:  COLONOSCOPY    Relevant Problems   CARDIO   (+) Hyperlipidemia      ENDO   (+) Hypothyroidism        Physical Exam    Airway     Mallampati score: I  TM Distance: >3 FB  Neck ROM: full  Mouth opening: >= 4 cm      Cardiovascular  Rhythm: regular, Rate: normal    Dental   No notable dental hx     Pulmonary   Breath sounds clear to auscultation    Neurological    He appears awake, alert and oriented x3.      Other Findings        Anesthesia Plan  ASA Score- 2     Anesthesia Type- IV sedation with anesthesia with ASA Monitors.         Additional Monitors:     Airway Plan:            Plan Factors-    Chart reviewed.        Patient is not a current smoker.              Induction-     Postoperative Plan- .   Monitoring Plan - Monitoring plan - standard ASA monitoring      Perioperative Resuscitation Plan - Level 1 - Full Code.       Informed Consent- Anesthetic plan and risks discussed with patient.  I personally reviewed this patient with the CRNA. Discussed and agreed on the Anesthesia Plan with the CRNA..      NPO Status:  Vitals Value Taken Time   Date of last liquid 06/10/25 06/10/25 08:21   Time of last liquid 0300 06/10/25 08:21   Date of last solid 06/08/25 06/10/25 08:21   Time of last solid 1900 06/10/25 08:21

## 2025-06-10 NOTE — H&P
History and Physical -  [facility]  Kolby Browning 65 y.o. male MRN: [id number]        HPI: 65-year-old male with history of colon polyps.  Regular bowel movements.    Historical Information   Past Medical History[1]  Past Surgical History[2]  Social History   Social History     Substance and Sexual Activity   Alcohol Use Yes    Comment: occ     Social History     Substance and Sexual Activity   Drug Use Never     Tobacco Use History[3]  Family History[4]    Meds/Allergies     Not in a hospital admission.    Allergies[5]    Objective     Blood pressure 148/80, pulse 55, temperature 97.9 °F (36.6 °C), temperature source Temporal, resp. rate 18, SpO2 97%.    Physical Exam:    Chest- CTA  Heart- RRR  Abdomen- NT/ND  Extremities- No edema    ASSESSMENT:     History of colon polyps    PLAN:    Colonoscopy                   [1]   Past Medical History:  Diagnosis Date    Colon polyp     Hyperlipidemia     Hypothyroidism    [2]   Past Surgical History:  Procedure Laterality Date    COLONOSCOPY      CYST REMOVAL      lower back     HEMORROIDECTOMY      two surgerys in late 30-40's   [3]   Social History  Tobacco Use   Smoking Status Former    Current packs/day: 0.00    Types: Cigarettes    Quit date:     Years since quittin.4   Smokeless Tobacco Never   [4]   Family History  Problem Relation Name Age of Onset    Cancer Mother      Ovarian cancer Mother      Heart attack Father      Thyroid cancer Maternal Grandmother      Hyperlipidemia Sister      Hyperlipidemia Brother      Hyperlipidemia Sister     [5] No Known Allergies